# Patient Record
Sex: MALE | Race: WHITE | Employment: UNEMPLOYED | URBAN - METROPOLITAN AREA
[De-identification: names, ages, dates, MRNs, and addresses within clinical notes are randomized per-mention and may not be internally consistent; named-entity substitution may affect disease eponyms.]

---

## 2017-03-23 PROBLEM — K44.9 HIATAL HERNIA: Status: ACTIVE | Noted: 2017-03-23

## 2017-03-23 PROBLEM — Z87.19 H/O GASTRITIS: Status: ACTIVE | Noted: 2017-03-23

## 2018-05-03 ENCOUNTER — OFFICE VISIT (OUTPATIENT)
Dept: CARDIOLOGY CLINIC | Age: 78
End: 2018-05-03
Payer: MEDICARE

## 2018-05-03 VITALS
HEART RATE: 63 BPM | SYSTOLIC BLOOD PRESSURE: 100 MMHG | HEIGHT: 68 IN | DIASTOLIC BLOOD PRESSURE: 68 MMHG | WEIGHT: 176 LBS | BODY MASS INDEX: 26.67 KG/M2

## 2018-05-03 DIAGNOSIS — I45.10 RBBB (RIGHT BUNDLE BRANCH BLOCK): Primary | ICD-10-CM

## 2018-05-03 DIAGNOSIS — K44.9 HIATAL HERNIA: ICD-10-CM

## 2018-05-03 DIAGNOSIS — E11.65 TYPE 2 DIABETES MELLITUS WITH HYPERGLYCEMIA, WITH LONG-TERM CURRENT USE OF INSULIN (HCC): ICD-10-CM

## 2018-05-03 DIAGNOSIS — F32.A DEPRESSION, UNSPECIFIED DEPRESSION TYPE: ICD-10-CM

## 2018-05-03 DIAGNOSIS — I49.1 PREMATURE ATRIAL CONTRACTIONS: ICD-10-CM

## 2018-05-03 DIAGNOSIS — Z87.19 H/O GASTRITIS: ICD-10-CM

## 2018-05-03 DIAGNOSIS — E78.2 MIXED HYPERLIPIDEMIA: ICD-10-CM

## 2018-05-03 DIAGNOSIS — Z79.4 TYPE 2 DIABETES MELLITUS WITH HYPERGLYCEMIA, WITH LONG-TERM CURRENT USE OF INSULIN (HCC): ICD-10-CM

## 2018-05-03 DIAGNOSIS — J45.40 MODERATE PERSISTENT ASTHMA WITHOUT COMPLICATION: ICD-10-CM

## 2018-05-03 DIAGNOSIS — H91.93 BILATERAL HEARING LOSS, UNSPECIFIED HEARING LOSS TYPE: ICD-10-CM

## 2018-05-03 DIAGNOSIS — Z88.8 ASPIRIN ALLERGY: ICD-10-CM

## 2018-05-03 DIAGNOSIS — I35.1 NONRHEUMATIC AORTIC VALVE INSUFFICIENCY: ICD-10-CM

## 2018-05-03 PROCEDURE — 1036F TOBACCO NON-USER: CPT | Performed by: INTERNAL MEDICINE

## 2018-05-03 PROCEDURE — 1123F ACP DISCUSS/DSCN MKR DOCD: CPT | Performed by: INTERNAL MEDICINE

## 2018-05-03 PROCEDURE — 3046F HEMOGLOBIN A1C LEVEL >9.0%: CPT | Performed by: INTERNAL MEDICINE

## 2018-05-03 PROCEDURE — 2022F DILAT RTA XM EVC RTNOPTHY: CPT | Performed by: INTERNAL MEDICINE

## 2018-05-03 PROCEDURE — 3017F COLORECTAL CA SCREEN DOC REV: CPT | Performed by: INTERNAL MEDICINE

## 2018-05-03 PROCEDURE — 4040F PNEUMOC VAC/ADMIN/RCVD: CPT | Performed by: INTERNAL MEDICINE

## 2018-05-03 PROCEDURE — 93000 ELECTROCARDIOGRAM COMPLETE: CPT | Performed by: INTERNAL MEDICINE

## 2018-05-03 PROCEDURE — G8419 CALC BMI OUT NRM PARAM NOF/U: HCPCS | Performed by: INTERNAL MEDICINE

## 2018-05-03 PROCEDURE — 99213 OFFICE O/P EST LOW 20 MIN: CPT | Performed by: INTERNAL MEDICINE

## 2018-05-03 PROCEDURE — G8427 DOCREV CUR MEDS BY ELIG CLIN: HCPCS | Performed by: INTERNAL MEDICINE

## 2018-10-02 ENCOUNTER — HOSPITAL ENCOUNTER (OUTPATIENT)
Dept: CARDIOLOGY | Age: 78
Discharge: HOME | End: 2018-10-02
Payer: MEDICARE

## 2018-10-02 VITALS
BODY MASS INDEX: 27.43 KG/M2 | DIASTOLIC BLOOD PRESSURE: 70 MMHG | HEART RATE: 75 BPM | SYSTOLIC BLOOD PRESSURE: 138 MMHG | HEIGHT: 68 IN | WEIGHT: 181 LBS | OXYGEN SATURATION: 97 %

## 2018-10-02 DIAGNOSIS — I35.1 NONRHEUMATIC AORTIC VALVE INSUFFICIENCY: ICD-10-CM

## 2018-10-02 DIAGNOSIS — I45.10 RBBB (RIGHT BUNDLE BRANCH BLOCK): ICD-10-CM

## 2018-10-02 LAB
LV EF: 55 %
LVEF MODALITY: NORMAL

## 2018-10-02 PROCEDURE — 78452 HT MUSCLE IMAGE SPECT MULT: CPT

## 2018-10-02 PROCEDURE — 93306 TTE W/DOPPLER COMPLETE: CPT

## 2018-10-02 PROCEDURE — 2580000003 HC RX 258: Performed by: INTERNAL MEDICINE

## 2018-10-02 PROCEDURE — A9502 TC99M TETROFOSMIN: HCPCS | Performed by: INTERNAL MEDICINE

## 2018-10-02 PROCEDURE — 93017 CV STRESS TEST TRACING ONLY: CPT

## 2018-10-02 PROCEDURE — 3430000000 HC RX DIAGNOSTIC RADIOPHARMACEUTICAL: Performed by: INTERNAL MEDICINE

## 2018-10-02 RX ORDER — SODIUM CHLORIDE 0.9 % (FLUSH) 0.9 %
10 SYRINGE (ML) INJECTION PRN
Status: DISCONTINUED | OUTPATIENT
Start: 2018-10-02 | End: 2018-10-03 | Stop reason: HOSPADM

## 2018-10-02 RX ADMIN — TETROFOSMIN 32.1 MILLICURIE: 0.23 INJECTION, POWDER, LYOPHILIZED, FOR SOLUTION INTRAVENOUS at 12:11

## 2018-10-02 RX ADMIN — TETROFOSMIN 10 MILLICURIE: 0.23 INJECTION, POWDER, LYOPHILIZED, FOR SOLUTION INTRAVENOUS at 09:15

## 2018-10-02 RX ADMIN — Medication 10 ML: at 12:11

## 2018-10-02 RX ADMIN — Medication 10 ML: at 09:15

## 2018-10-18 ENCOUNTER — OFFICE VISIT (OUTPATIENT)
Dept: CARDIOLOGY CLINIC | Age: 78
End: 2018-10-18
Payer: MEDICARE

## 2018-10-18 VITALS
WEIGHT: 176 LBS | DIASTOLIC BLOOD PRESSURE: 78 MMHG | HEIGHT: 68 IN | HEART RATE: 64 BPM | SYSTOLIC BLOOD PRESSURE: 130 MMHG | BODY MASS INDEX: 26.67 KG/M2

## 2018-10-18 DIAGNOSIS — E11.65 TYPE 2 DIABETES MELLITUS WITH HYPERGLYCEMIA, WITH LONG-TERM CURRENT USE OF INSULIN (HCC): ICD-10-CM

## 2018-10-18 DIAGNOSIS — J33.9 NASAL POLYPOSIS: ICD-10-CM

## 2018-10-18 DIAGNOSIS — J45.40 MODERATE PERSISTENT ASTHMA WITHOUT COMPLICATION: ICD-10-CM

## 2018-10-18 DIAGNOSIS — I45.10 RBBB (RIGHT BUNDLE BRANCH BLOCK): Primary | ICD-10-CM

## 2018-10-18 DIAGNOSIS — E78.2 MIXED HYPERLIPIDEMIA: ICD-10-CM

## 2018-10-18 DIAGNOSIS — Z87.19 H/O GASTRITIS: ICD-10-CM

## 2018-10-18 DIAGNOSIS — Z88.8 ASPIRIN ALLERGY: ICD-10-CM

## 2018-10-18 DIAGNOSIS — J32.9 CHRONIC SINUSITIS, UNSPECIFIED LOCATION: ICD-10-CM

## 2018-10-18 DIAGNOSIS — K44.9 HIATAL HERNIA: ICD-10-CM

## 2018-10-18 DIAGNOSIS — F32.A DEPRESSION, UNSPECIFIED DEPRESSION TYPE: ICD-10-CM

## 2018-10-18 DIAGNOSIS — H91.93 BILATERAL HEARING LOSS, UNSPECIFIED HEARING LOSS TYPE: ICD-10-CM

## 2018-10-18 DIAGNOSIS — I35.1 NONRHEUMATIC AORTIC VALVE INSUFFICIENCY: ICD-10-CM

## 2018-10-18 DIAGNOSIS — Z79.4 TYPE 2 DIABETES MELLITUS WITH HYPERGLYCEMIA, WITH LONG-TERM CURRENT USE OF INSULIN (HCC): ICD-10-CM

## 2018-10-18 PROCEDURE — G8484 FLU IMMUNIZE NO ADMIN: HCPCS | Performed by: INTERNAL MEDICINE

## 2018-10-18 PROCEDURE — G8427 DOCREV CUR MEDS BY ELIG CLIN: HCPCS | Performed by: INTERNAL MEDICINE

## 2018-10-18 PROCEDURE — 1123F ACP DISCUSS/DSCN MKR DOCD: CPT | Performed by: INTERNAL MEDICINE

## 2018-10-18 PROCEDURE — 4040F PNEUMOC VAC/ADMIN/RCVD: CPT | Performed by: INTERNAL MEDICINE

## 2018-10-18 PROCEDURE — 1101F PT FALLS ASSESS-DOCD LE1/YR: CPT | Performed by: INTERNAL MEDICINE

## 2018-10-18 PROCEDURE — 2022F DILAT RTA XM EVC RTNOPTHY: CPT | Performed by: INTERNAL MEDICINE

## 2018-10-18 PROCEDURE — 3046F HEMOGLOBIN A1C LEVEL >9.0%: CPT | Performed by: INTERNAL MEDICINE

## 2018-10-18 PROCEDURE — G8419 CALC BMI OUT NRM PARAM NOF/U: HCPCS | Performed by: INTERNAL MEDICINE

## 2018-10-18 PROCEDURE — 1036F TOBACCO NON-USER: CPT | Performed by: INTERNAL MEDICINE

## 2018-10-18 PROCEDURE — 3017F COLORECTAL CA SCREEN DOC REV: CPT | Performed by: INTERNAL MEDICINE

## 2018-10-18 PROCEDURE — 99214 OFFICE O/P EST MOD 30 MIN: CPT | Performed by: INTERNAL MEDICINE

## 2019-04-02 ENCOUNTER — OFFICE VISIT (OUTPATIENT)
Dept: CARDIOLOGY CLINIC | Age: 79
End: 2019-04-02
Payer: MEDICARE

## 2019-04-02 VITALS
BODY MASS INDEX: 26.22 KG/M2 | DIASTOLIC BLOOD PRESSURE: 68 MMHG | WEIGHT: 173 LBS | SYSTOLIC BLOOD PRESSURE: 130 MMHG | HEIGHT: 68 IN | HEART RATE: 54 BPM

## 2019-04-02 DIAGNOSIS — J45.20 MILD INTERMITTENT ASTHMA WITHOUT COMPLICATION: ICD-10-CM

## 2019-04-02 DIAGNOSIS — Z87.09 H/O NASAL POLYP: ICD-10-CM

## 2019-04-02 DIAGNOSIS — Z88.8 ASPIRIN ALLERGY: ICD-10-CM

## 2019-04-02 DIAGNOSIS — Z79.4 INSULIN-REQUIRING OR DEPENDENT TYPE II DIABETES MELLITUS (HCC): ICD-10-CM

## 2019-04-02 DIAGNOSIS — R94.39 ABNORMAL NUCLEAR STRESS TEST: ICD-10-CM

## 2019-04-02 DIAGNOSIS — I35.1 TRACE AORTIC REGURGITATION BY PRIOR ECHOCARDIOGRAM: ICD-10-CM

## 2019-04-02 DIAGNOSIS — E78.2 MIXED HYPERLIPIDEMIA: ICD-10-CM

## 2019-04-02 DIAGNOSIS — R00.1 SINUS BRADYCARDIA: ICD-10-CM

## 2019-04-02 DIAGNOSIS — K44.9 HIATAL HERNIA: ICD-10-CM

## 2019-04-02 DIAGNOSIS — I45.10 RBBB (RIGHT BUNDLE BRANCH BLOCK): Primary | ICD-10-CM

## 2019-04-02 DIAGNOSIS — Z87.19 H/O GASTRITIS: ICD-10-CM

## 2019-04-02 DIAGNOSIS — E11.9 INSULIN-REQUIRING OR DEPENDENT TYPE II DIABETES MELLITUS (HCC): ICD-10-CM

## 2019-04-02 DIAGNOSIS — H91.93 BILATERAL HEARING LOSS, UNSPECIFIED HEARING LOSS TYPE: ICD-10-CM

## 2019-04-02 DIAGNOSIS — Z86.59 H/O: DEPRESSION: ICD-10-CM

## 2019-04-02 PROCEDURE — 1036F TOBACCO NON-USER: CPT | Performed by: INTERNAL MEDICINE

## 2019-04-02 PROCEDURE — G8427 DOCREV CUR MEDS BY ELIG CLIN: HCPCS | Performed by: INTERNAL MEDICINE

## 2019-04-02 PROCEDURE — 93000 ELECTROCARDIOGRAM COMPLETE: CPT | Performed by: INTERNAL MEDICINE

## 2019-04-02 PROCEDURE — 4040F PNEUMOC VAC/ADMIN/RCVD: CPT | Performed by: INTERNAL MEDICINE

## 2019-04-02 PROCEDURE — 99213 OFFICE O/P EST LOW 20 MIN: CPT | Performed by: INTERNAL MEDICINE

## 2019-04-02 PROCEDURE — 1123F ACP DISCUSS/DSCN MKR DOCD: CPT | Performed by: INTERNAL MEDICINE

## 2019-04-02 PROCEDURE — G8419 CALC BMI OUT NRM PARAM NOF/U: HCPCS | Performed by: INTERNAL MEDICINE

## 2019-04-02 NOTE — PROGRESS NOTES
OFFICE VISIT        PRIMARY CARE PHYSICIAN:    Tiffanie Leal MD     ALLERGIES / SENSITIVITIES:    Allergies   Allergen Reactions    Aspirin Shortness Of Breath, Nausea And Vomiting and Palpitations     diaphoresis    Dust Mite Extract Other (See Comments)     Nasal congestion    Nsaids Shortness Of Breath, Nausea And Vomiting and Palpitations     diaphoresis    Tylenol [Acetaminophen] Shortness Of Breath, Nausea And Vomiting and Other (See Comments)     Palpitations, diaphoresis    Pcn [Penicillins] Rash      REVIEWED MEDICATIONS:      Current Outpatient Medications:     saxagliptin (ONGLYZA) 2.5 MG TABS tablet, Take 2.5 mg by mouth daily, Disp: , Rfl:     Multiple Vitamins-Minerals (THERAPEUTIC MULTIVITAMIN-MINERALS) tablet, Take 1 tablet by mouth daily, Disp: , Rfl:     cetirizine (ZYRTEC) 10 MG tablet, Take 10 mg by mouth daily, Disp: , Rfl:     metFORMIN (GLUCOPHAGE) 1000 MG tablet, Take 1,000 mg by mouth 2 times daily (with meals). , Disp: , Rfl:     insulin glargine (LANTUS) 100 UNIT/ML injection vial, Inject into the skin 2 times daily 4units at breakfast and 8 at bedtime, Disp: , Rfl:     montelukast (SINGULAIR) 10 MG tablet, Take 10 mg by mouth nightly., Disp: , Rfl:     simvastatin (ZOCOR) 20 MG tablet, Take 40 mg by mouth nightly., Disp: , Rfl:     citalopram (CELEXA) 10 MG tablet, Take 10 mg by mouth daily. , Disp: , Rfl:     ALBUTEROL IN, Inhale  into the lungs as needed. To bring to hospital, Disp: , Rfl:     budesonide-formoterol (SYMBICORT) 160-4.5 MCG/ACT AERO, Inhale 2 puffs into the lungs daily. Instructed to use am of surgery, Disp: , Rfl:     S: REASON FOR VISIT:    Risk factors for coronary artery disease and aortic valve disease. Betha Ahumada is a pleasant 79-year-old male with cardiovascular history as described below. 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 He denies chest pain or dyspnea with his daily activities.   He denies orthopnea, PNDs, lower extremity swelling, palpitations, dizziness, presyncope or syncope. He had relatively recent blood tests done at the [place]. REVIEW OF SYSTEMS:    CONSTITUTIONAL: Denies fevers, chills or night sweats. HEENT: Denies headaches. He wears eyeglasses and has diminished hearing bilaterally but denies any recent changes in hearing or vision. Denies hoarseness, hemoptysis, hematemesis or epistaxis. ENDOCRINE: Denies heat or cold intolerance. He denies polyphagia, polydipsia or polyuria. MUSCULOSKELETAL: Denies any significant arthralgias or myalgias. SKIN: Denies rashes, ulcers or itching. HEME/LYMPH: Denies lymphadenopathy or easy bruisability. HEART: As above. LUNGS: Denies cough or sputum production. GI: He reported an occasional incidence of food getting stuck in his throat in the past which has resolved.  Denies abdominal pain, nausea, vomiting,diarrhea, constipation, rectal bleeding or tarry stools. : Denies hematuria or dysuria. PSYCHIATRIC: He has history of depression but denies any recent mood changes or problems with anxiety. NEUROLOGIC: Denies memory loss, motor weakness, numbness, tingling or tremors.     CARDIOVASCULAR HISTORY:   1. Right bundle branch block. 2. PVC's. 3. Aspirin allergy. 4. Diabetes mellitus. 5. Hyperlipidemia. 6. Myocardial perfusion imaging study done on 2/18/2015 was reported as a normal myocardial perfusion study with no evidence of stress-induced ischemia or infarct with normal left ventricular function with a calculated ejection fraction of 52%. Of note, the patient exercised on a Lukas protocol, achieving 9.6 METS and 85% of the maximum predicted heart rate. 7. Echocardiogram done on 2/12/2015 was reported as showing normal left ventricular size and systolic function with an ejection fraction of 60-65% with stage 1 left ventricular diastolic dysfunction. Mildly dilated left atrium. Mild aortic regurgitation. 8.  Treadmill nuclear stress test done on 10/2/2018: Lukas protocol.   7 minutes and 25 seconds. 7 METS. 80% of the maximum predicted heart rate. Physiologic BP response. No chest pain. No ischemic EKG changes at the heart rate achieved. Nuclear images showed a large, mild, partially reversible defect involving the inferior wall, more consistent with diaphragmatic attenuation and motion artifact and less likely the result of stress-induced ischemia with the gated views showing no regional wall motion abnormality with a calculated ejection fraction of 62%. 9.  Echocardiogram done on 10/2/2018 showed normal left ventricular size and wall thickness with no gross regional wall motion abnormality with an estimated ejection fraction of 55% with stage 1 left ventricular diastolic dysfunction, normal right ventricular size and function. Mildly dilated left atrium, trace aortic regurgitation.      PAST MEDICAL HISTORY:  1. As under cardiovascular history. 2. Asthma. 3. Diminished hearing. 4. Sinus/nasal polyps. S/P bilateral antrostomy/ethmoidectomy/sphenoidectomy/polypectomy on 3/6/2013.  5. S/P tonsillectomy. 6. S/P bilateral inguinal hernia repair. 7. Depression. 8. EGD 2016 for dysphagia:  Mild gastritis and 2-cm hiatal hernia.     FAMILY HISTORY:  Mother  at age 80 from myocardial infarction. Father  at age 80: He needed feeding tube, which he refused. O:  COMPLETE PHYSICAL EXAM:      /68   Pulse 54   Ht 5' 8\" (1.727 m)   Wt 173 lb (78.5 kg)   BMI 26.30 kg/m²      General: Elderly male in no acute distress. Head & Neck: Atraumatic and normocephalic. No JVD. No carotid bruits. Carotid upstrokes normal bilaterally. No thyromegaly. Sclerae not icteric. No xanthelasmas. Mucus membranes moist.  Chest: Symmetrical and nontender. No deformities. Lungs: Clear to auscultation bilaterally. Heart: Normal S1 and S2. No S3 or S4. Grade 1/6 systolic murmur heard at the right upper sternal border. No diastolic blow heard.   Abdomen: Soft and nontender without organomegaly or masses. No bruits. Normal bowel sounds. Extremities: No edema. Posterior tibialis pulses felt bilaterally. Skin: Normal turgor. No rashes or ulcers noted. Neurologic: Oriented x3. No motor or sensory deficit detected.                  REVIEW OF DIAGNOSTIC TESTS:    -EKG done today showed sinus bradycardia with RBBB.                 ASSESSMENT / DIAGNOSIS:   1. Diabetes mellitus - on insulin and oral hypoglycemic agents. 2. Hyperlipidemia:  On statin therapy. 3. Right bundle branch block. 4. Sinus bradycardia. 5. Trace aortic regurgitation. 6. Asthma. 7. Nasal polyps, S/P excision. 8. Aspirin allergy. 9. Bilateral hearing loss. 10. Depression. 11. Hiatal hernia with history of gastritis. 12. Equivocal stress test showing a mild, large, partially reversible defect involving the inferior wall, likely the result of diaphragmatic attenuation and motion artifact and less likely the result of stress-induced ischemia. TREATMENT PLAN:  1. Reassure. 2.  Patient advised to remain active. 3.  Follow up with cardiology in one year or on prn basis. Mary Alice Grissom.  [facility] [phone]

## 2020-11-02 PROBLEM — J45.40 MODERATE PERSISTENT ASTHMA: Status: ACTIVE | Noted: 2020-11-02

## 2020-11-02 RX ORDER — ACETAMINOPHEN 325 MG/1
650 TABLET ORAL ONCE
Status: CANCELLED | OUTPATIENT
Start: 2020-11-02

## 2020-11-02 RX ORDER — DIPHENHYDRAMINE HYDROCHLORIDE 50 MG/ML
50 INJECTION INTRAMUSCULAR; INTRAVENOUS ONCE
Status: CANCELLED | OUTPATIENT
Start: 2020-11-02

## 2020-11-02 RX ORDER — EPINEPHRINE 1 MG/ML
0.3 INJECTION, SOLUTION, CONCENTRATE INTRAVENOUS ONCE
Status: CANCELLED | OUTPATIENT
Start: 2020-11-02

## 2020-12-22 RX ORDER — EPINEPHRINE 1 MG/ML
0.3 INJECTION, SOLUTION, CONCENTRATE INTRAVENOUS PRN
Status: CANCELLED | OUTPATIENT
Start: 2020-12-22

## 2020-12-22 RX ORDER — DIPHENHYDRAMINE HYDROCHLORIDE 50 MG/ML
50 INJECTION INTRAMUSCULAR; INTRAVENOUS ONCE
Status: CANCELLED | OUTPATIENT
Start: 2020-12-22 | End: 2020-12-22

## 2021-01-29 ENCOUNTER — IMMUNIZATION (OUTPATIENT)
Dept: PRIMARY CARE CLINIC | Age: 81
End: 2021-01-29
Payer: MEDICARE

## 2021-01-29 PROCEDURE — 0001A COVID-19, PFIZER VACCINE 30MCG/0.3ML DOSE: CPT | Performed by: NURSE PRACTITIONER

## 2021-01-29 PROCEDURE — 91300 COVID-19, PFIZER VACCINE 30MCG/0.3ML DOSE: CPT | Performed by: NURSE PRACTITIONER

## 2021-02-19 ENCOUNTER — IMMUNIZATION (OUTPATIENT)
Dept: PRIMARY CARE CLINIC | Age: 81
End: 2021-02-19
Payer: MEDICARE

## 2021-02-19 PROCEDURE — 0002A COVID-19, PFIZER VACCINE 30MCG/0.3ML DOSE: CPT | Performed by: PHYSICIAN ASSISTANT

## 2021-02-19 PROCEDURE — 91300 COVID-19, PFIZER VACCINE 30MCG/0.3ML DOSE: CPT | Performed by: PHYSICIAN ASSISTANT

## 2024-03-22 ENCOUNTER — HOSPITAL ENCOUNTER (EMERGENCY)
Age: 84
Discharge: HOME | End: 2024-03-22
Payer: MEDICARE

## 2024-03-22 VITALS
DIASTOLIC BLOOD PRESSURE: 77 MMHG | OXYGEN SATURATION: 92 % | SYSTOLIC BLOOD PRESSURE: 131 MMHG | TEMPERATURE: 98.2 F | RESPIRATION RATE: 18 BRPM | HEART RATE: 72 BPM

## 2024-03-22 DIAGNOSIS — J45.909 UNCOMPLICATED ASTHMA, UNSPECIFIED ASTHMA SEVERITY, UNSPECIFIED WHETHER PERSISTENT: Primary | ICD-10-CM

## 2024-03-22 DIAGNOSIS — J20.9 ACUTE BRONCHITIS, UNSPECIFIED ORGANISM: ICD-10-CM

## 2024-03-22 LAB
INFLUENZA A BY PCR: NOT DETECTED
INFLUENZA B BY PCR: NOT DETECTED
SARS-COV-2 RDRP RESP QL NAA+PROBE: NOT DETECTED
SPECIMEN DESCRIPTION: NORMAL

## 2024-03-22 PROCEDURE — 87502 INFLUENZA DNA AMP PROBE: CPT

## 2024-03-22 PROCEDURE — 87635 SARS-COV-2 COVID-19 AMP PRB: CPT

## 2024-03-22 PROCEDURE — 99211 OFF/OP EST MAY X REQ PHY/QHP: CPT

## 2024-03-22 RX ORDER — DOXYCYCLINE HYCLATE 100 MG
100 TABLET ORAL 2 TIMES DAILY
Qty: 14 TABLET | Refills: 0 | Status: SHIPPED | OUTPATIENT
Start: 2024-03-22 | End: 2024-03-29

## 2024-03-22 RX ORDER — PREDNISONE 10 MG/1
TABLET ORAL
Qty: 12 TABLET | Refills: 0 | Status: SHIPPED | OUTPATIENT
Start: 2024-03-22

## 2024-03-22 NOTE — ED PROVIDER NOTES
[facility] URGENT CARE  EMERGENCY DEPARTMENT ENCOUNTER        NAME: Dustin Meek  :  1940  MRN:  [id number]  Date of evaluation: 3/22/2024  Provider: Jaden Jaime PA-C  PCP: David Mcbride MD  Note Started : 12:53 PM EDT 3/22/24    Chief Complaint: Shortness of Breath (Asthma  cough  congestion)      This is a 83-year-old male that presents to urgent care with family.  For the past 3 to 4 days he has been having some increased asthma symptoms has been wheezing along with some cough and congestion in his chest.  He did state a little bit of shortness of breath.  He denies any lightheadedness or dizziness.  No other chest pain.  No abdominal pain nausea vomiting diarrhea or urinary symptoms.  He has been using breathing treatments.  On first contact patient he appears to be in no acute distress.        Review of Systems  Pertinent positives and negatives are stated within HPI, all other systems reviewed and are negative.     Allergies: Aspirin, Dust mite extract, Nsaids, Tylenol [acetaminophen], and Pcn [penicillins]     --------------------------------------------- PAST HISTORY ---------------------------------------------  Past Medical History:  has a past medical history of Asthma, Depression, Diabetes mellitus (HCC), [place] (hard of hearing), Hyperlipidemia, and Sinus polyp.    Past Surgical History:  has a past surgical history that includes hernia repair; Tonsillectomy; and other surgical history (3/6/2013).    Social History:  reports that he quit smoking about 54 years ago. He has never used smokeless tobacco. He reports that he does not drink alcohol and does not use drugs.    Family History: family history is not on file.     The patient’s home medications have been reviewed.    The nursing notes within the ED encounter have been reviewed.     ------------------------------------------------SCREENINGS----------------------------------------------

## 2024-05-13 ENCOUNTER — APPOINTMENT (OUTPATIENT)
Dept: GENERAL RADIOLOGY | Age: 84
End: 2024-05-13
Payer: MEDICARE

## 2024-05-13 ENCOUNTER — APPOINTMENT (OUTPATIENT)
Dept: CT IMAGING | Age: 84
End: 2024-05-13
Payer: MEDICARE

## 2024-05-13 ENCOUNTER — HOSPITAL ENCOUNTER (EMERGENCY)
Age: 84
Discharge: HOME | End: 2024-05-13
Attending: EMERGENCY MEDICINE
Payer: MEDICARE

## 2024-05-13 VITALS
OXYGEN SATURATION: 100 % | DIASTOLIC BLOOD PRESSURE: 88 MMHG | BODY MASS INDEX: 25.85 KG/M2 | RESPIRATION RATE: 16 BRPM | TEMPERATURE: 98.2 F | WEIGHT: 170 LBS | SYSTOLIC BLOOD PRESSURE: 166 MMHG | HEART RATE: 78 BPM

## 2024-05-13 DIAGNOSIS — I45.2 BIFASCICULAR BLOCK: ICD-10-CM

## 2024-05-13 DIAGNOSIS — R40.4 TRANSIENT ALTERATION OF AWARENESS: Primary | ICD-10-CM

## 2024-05-13 DIAGNOSIS — R03.0 ELEVATED BLOOD PRESSURE READING: ICD-10-CM

## 2024-05-13 LAB
ALBUMIN SERPL-MCNC: 3.7 G/DL (ref 3.5–5.2)
ALP SERPL-CCNC: 93 U/L (ref 40–129)
ALT SERPL-CCNC: 13 U/L (ref 0–40)
ANION GAP SERPL CALCULATED.3IONS-SCNC: 10 MMOL/L (ref 7–16)
AST SERPL-CCNC: 16 U/L (ref 0–39)
BASOPHILS # BLD: 0.04 K/UL (ref 0–0.2)
BASOPHILS NFR BLD: 0 % (ref 0–2)
BILIRUB SERPL-MCNC: 0.5 MG/DL (ref 0–1.2)
BNP SERPL-MCNC: 187 PG/ML (ref 0–450)
BUN SERPL-MCNC: 20 MG/DL (ref 6–23)
CALCIUM SERPL-MCNC: 9.5 MG/DL (ref 8.6–10.2)
CHLORIDE SERPL-SCNC: 99 MMOL/L (ref 98–107)
CO2 SERPL-SCNC: 28 MMOL/L (ref 22–29)
CREAT SERPL-MCNC: 1 MG/DL (ref 0.7–1.2)
EOSINOPHIL # BLD: 0.11 K/UL (ref 0.05–0.5)
EOSINOPHILS RELATIVE PERCENT: 1 % (ref 0–6)
ERYTHROCYTE [DISTWIDTH] IN BLOOD BY AUTOMATED COUNT: 13.1 % (ref 11.5–15)
GFR, ESTIMATED: 79 ML/MIN/1.73M2
GLUCOSE BLD-MCNC: 240 MG/DL (ref 74–99)
GLUCOSE SERPL-MCNC: 328 MG/DL (ref 74–99)
HCT VFR BLD AUTO: 47.7 % (ref 37–54)
HGB BLD-MCNC: 15.6 G/DL (ref 12.5–16.5)
IMM GRANULOCYTES # BLD AUTO: 0.03 K/UL (ref 0–0.58)
IMM GRANULOCYTES NFR BLD: 0 % (ref 0–5)
LYMPHOCYTES NFR BLD: 0.95 K/UL (ref 1.5–4)
LYMPHOCYTES RELATIVE PERCENT: 10 % (ref 20–42)
MCH RBC QN AUTO: 30.5 PG (ref 26–35)
MCHC RBC AUTO-ENTMCNC: 32.7 G/DL (ref 32–34.5)
MCV RBC AUTO: 93.3 FL (ref 80–99.9)
MONOCYTES NFR BLD: 1.27 K/UL (ref 0.1–0.95)
MONOCYTES NFR BLD: 14 % (ref 2–12)
NEUTROPHILS NFR BLD: 74 % (ref 43–80)
NEUTS SEG NFR BLD: 6.8 K/UL (ref 1.8–7.3)
PLATELET # BLD AUTO: 206 K/UL (ref 130–450)
PMV BLD AUTO: 11.6 FL (ref 7–12)
POTASSIUM SERPL-SCNC: 4.6 MMOL/L (ref 3.5–5)
PROT SERPL-MCNC: 7.2 G/DL (ref 6.4–8.3)
RBC # BLD AUTO: 5.11 M/UL (ref 3.8–5.8)
SODIUM SERPL-SCNC: 137 MMOL/L (ref 132–146)
TROPONIN I SERPL HS-MCNC: 21 NG/L (ref 0–11)
TROPONIN I SERPL HS-MCNC: 22 NG/L (ref 0–11)
WBC OTHER # BLD: 9.2 K/UL (ref 4.5–11.5)

## 2024-05-13 PROCEDURE — 84484 ASSAY OF TROPONIN QUANT: CPT

## 2024-05-13 PROCEDURE — 82962 GLUCOSE BLOOD TEST: CPT

## 2024-05-13 PROCEDURE — 85025 COMPLETE CBC W/AUTO DIFF WBC: CPT

## 2024-05-13 PROCEDURE — 80053 COMPREHEN METABOLIC PANEL: CPT

## 2024-05-13 PROCEDURE — 70450 CT HEAD/BRAIN W/O DYE: CPT

## 2024-05-13 PROCEDURE — 99285 EMERGENCY DEPT VISIT HI MDM: CPT

## 2024-05-13 PROCEDURE — 83880 ASSAY OF NATRIURETIC PEPTIDE: CPT

## 2024-05-13 PROCEDURE — 93005 ELECTROCARDIOGRAM TRACING: CPT | Performed by: EMERGENCY MEDICINE

## 2024-05-13 PROCEDURE — 71046 X-RAY EXAM CHEST 2 VIEWS: CPT

## 2024-05-13 ASSESSMENT — PAIN - FUNCTIONAL ASSESSMENT: PAIN_FUNCTIONAL_ASSESSMENT: NONE - DENIES PAIN

## 2024-05-13 ASSESSMENT — LIFESTYLE VARIABLES
HOW MANY STANDARD DRINKS CONTAINING ALCOHOL DO YOU HAVE ON A TYPICAL DAY: PATIENT DOES NOT DRINK
HOW OFTEN DO YOU HAVE A DRINK CONTAINING ALCOHOL: NEVER

## 2024-05-14 LAB
EKG ATRIAL RATE: 77 BPM
EKG P AXIS: 25 DEGREES
EKG P-R INTERVAL: 164 MS
EKG Q-T INTERVAL: 398 MS
EKG QRS DURATION: 148 MS
EKG QTC CALCULATION (BAZETT): 450 MS
EKG R AXIS: -63 DEGREES
EKG T AXIS: -19 DEGREES
EKG VENTRICULAR RATE: 77 BPM

## 2024-05-14 PROCEDURE — 93010 ELECTROCARDIOGRAM REPORT: CPT | Performed by: INTERNAL MEDICINE

## 2024-05-14 NOTE — ED PROVIDER NOTES
[facility] EMERGENCY DEPARTMENT  EMERGENCY DEPARTMENT ENCOUNTER      Pt Name: Dustin Meek  MRN: [id number]  Birthdate 1940  Date of evaluation: 5/13/2024  Provider: Aldo Ma DO  PCP: David Mcbride MD  Note Started: 11:18 PM EDT 5/13/24    CHIEF COMPLAINT       Chief Complaint   Patient presents with    Altered Mental Status     States was driving to [place] this morning and there was a moment he was unsure of where he was at. Patient A&O x4 during triage. States has had abnormal HR and elevated BP       HISTORY OF PRESENT ILLNESS: 1 or more Elements   History From: Patient  Limitations to history : None    Dustin Meek is a 83 y.o. male who presents to the ED for evaluation of near syncope. Patient states that today he was driving to [place] when he felt as though he was nearly going to pass out. He states that he was awake but felt a near out of body experience. He was able to pull over and this then resolved on its own. Patient has no chest pain or shortness of breath. He states that his wife took his blood pressure and notes that it was elevated which prompted his wife to advised him to come to the ED. Patient has no other reported mitigating or worsening factors.    Nursing Notes were all reviewed and agreed with or any disagreements were addressed in the HPI.    REVIEW OF SYSTEMS :    Positives and Pertinent negatives as per HPI.     SURGICAL HISTORY     Past Surgical History:   Procedure Laterality Date    HERNIA REPAIR      inguinal bilateral    OTHER SURGICAL HISTORY  3/6/2013    bilateral arrtrostomy/ethmoidectomy/sphenoidectomy/plopectomy    TONSILLECTOMY         CURRENTMEDICATIONS       Discharge Medication List as of 5/13/2024  8:50 PM        CONTINUE these medications which have NOT CHANGED    Details   predniSONE (DELTASONE) 10 MG tablet Take 30mg by mouth daily x 2 days, then 20mg by mouth daily x 2 days then 10mg by mouth daily x 2 days.

## 2024-06-11 ENCOUNTER — OFFICE VISIT (OUTPATIENT)
Dept: CARDIOLOGY CLINIC | Age: 84
End: 2024-06-11
Payer: MEDICARE

## 2024-06-11 VITALS
SYSTOLIC BLOOD PRESSURE: 122 MMHG | DIASTOLIC BLOOD PRESSURE: 52 MMHG | WEIGHT: 166 LBS | RESPIRATION RATE: 18 BRPM | HEIGHT: 68 IN | BODY MASS INDEX: 25.16 KG/M2 | HEART RATE: 61 BPM

## 2024-06-11 DIAGNOSIS — E11.9 INSULIN-REQUIRING OR DEPENDENT TYPE II DIABETES MELLITUS (HCC): ICD-10-CM

## 2024-06-11 DIAGNOSIS — Z87.19 H/O GASTRITIS: ICD-10-CM

## 2024-06-11 DIAGNOSIS — F32.89 OTHER DEPRESSION: ICD-10-CM

## 2024-06-11 DIAGNOSIS — J45.40 MODERATE PERSISTENT ASTHMA WITHOUT COMPLICATION: ICD-10-CM

## 2024-06-11 DIAGNOSIS — E78.49 OTHER HYPERLIPIDEMIA: ICD-10-CM

## 2024-06-11 DIAGNOSIS — Z79.4 INSULIN-REQUIRING OR DEPENDENT TYPE II DIABETES MELLITUS (HCC): ICD-10-CM

## 2024-06-11 DIAGNOSIS — I10 PRIMARY HYPERTENSION: ICD-10-CM

## 2024-06-11 DIAGNOSIS — I45.10 RBBB (RIGHT BUNDLE BRANCH BLOCK): Primary | ICD-10-CM

## 2024-06-11 DIAGNOSIS — K44.9 HIATAL HERNIA: ICD-10-CM

## 2024-06-11 DIAGNOSIS — J33.9 NASAL POLYPOSIS: ICD-10-CM

## 2024-06-11 DIAGNOSIS — H91.93 BILATERAL HEARING LOSS, UNSPECIFIED HEARING LOSS TYPE: ICD-10-CM

## 2024-06-11 DIAGNOSIS — Z88.8 ASPIRIN ALLERGY: ICD-10-CM

## 2024-06-11 DIAGNOSIS — I49.3 PVC'S (PREMATURE VENTRICULAR CONTRACTIONS): ICD-10-CM

## 2024-06-11 DIAGNOSIS — I44.0 FIRST DEGREE ATRIOVENTRICULAR BLOCK: ICD-10-CM

## 2024-06-11 PROCEDURE — 3074F SYST BP LT 130 MM HG: CPT | Performed by: INTERNAL MEDICINE

## 2024-06-11 PROCEDURE — G8427 DOCREV CUR MEDS BY ELIG CLIN: HCPCS | Performed by: INTERNAL MEDICINE

## 2024-06-11 PROCEDURE — 3078F DIAST BP <80 MM HG: CPT | Performed by: INTERNAL MEDICINE

## 2024-06-11 PROCEDURE — 1036F TOBACCO NON-USER: CPT | Performed by: INTERNAL MEDICINE

## 2024-06-11 PROCEDURE — 1123F ACP DISCUSS/DSCN MKR DOCD: CPT | Performed by: INTERNAL MEDICINE

## 2024-06-11 PROCEDURE — G8419 CALC BMI OUT NRM PARAM NOF/U: HCPCS | Performed by: INTERNAL MEDICINE

## 2024-06-11 PROCEDURE — 93000 ELECTROCARDIOGRAM COMPLETE: CPT | Performed by: INTERNAL MEDICINE

## 2024-06-11 PROCEDURE — 99204 OFFICE O/P NEW MOD 45 MIN: CPT | Performed by: INTERNAL MEDICINE

## 2024-06-11 RX ORDER — OMEGA-3S/DHA/EPA/FISH OIL/D3 300MG-1000
400 CAPSULE ORAL DAILY
COMMUNITY

## 2024-06-11 RX ORDER — BUPROPION HYDROCHLORIDE 100 MG/1
150 TABLET ORAL DAILY
COMMUNITY

## 2024-06-11 RX ORDER — GLIPIZIDE 10 MG/1
10 TABLET ORAL
COMMUNITY

## 2024-06-11 RX ORDER — LISINOPRIL 5 MG/1
5 TABLET ORAL EVERY MORNING
COMMUNITY
Start: 2024-05-16

## 2024-06-11 RX ORDER — FLUTICASONE PROPIONATE 50 MCG
1 SPRAY, SUSPENSION (ML) NASAL DAILY
COMMUNITY

## 2024-06-11 RX ORDER — INSULIN ASPART 100 [IU]/ML
100 INJECTION, SOLUTION INTRAVENOUS; SUBCUTANEOUS
COMMUNITY
Start: 2024-04-16

## 2024-06-11 NOTE — PROGRESS NOTES
OFFICE VISIT        PRIMARY CARE PHYSICIAN:    David Mcbride MD         ALLERGIES / SENSITIVITIES:    Allergies   Allergen Reactions    Aspirin Shortness Of Breath, Nausea And Vomiting and Palpitations     diaphoresis    Dust Mite Extract Other (See Comments)     Nasal congestion    Nsaids Shortness Of Breath, Nausea And Vomiting and Palpitations     diaphoresis    Tylenol [Acetaminophen] Shortness Of Breath, Nausea And Vomiting and Other (See Comments)     Palpitations, diaphoresis    Pcn [Penicillins] Rash          REVIEWED MEDICATIONS:      Current Outpatient Medications:     insulin aspart (NOVOLOG FLEXPEN) 100 UNIT/ML injection pen, Inject 100 Units into the skin 3 times daily (before meals) 2 units every morning,1 unit at lunch, 5 units at dinner, Disp: , Rfl:     lisinopril (PRINIVIL;ZESTRIL) 5 MG tablet, Take 1 tablet by mouth every morning, Disp: , Rfl:     Semaglutide,0.25 or 0.5MG/DOS, 2 MG/3ML SOPN, Inject into the skin once a week, Disp: , Rfl:     glipiZIDE (GLUCOTROL) 10 MG tablet, Take 1 tablet by mouth 2 times daily (before meals) 1/2 tablet every morning, 1 tablet at dinner, Disp: , Rfl:     vitamin D3 (CHOLECALCIFEROL) 10 MCG (400 UNIT) TABS tablet, Take 1 tablet by mouth daily Taking 1 tablet everyday but Saturday and Sunday, Disp: , Rfl:     buPROPion (WELLBUTRIN) 100 MG tablet, Take 1.5 tablets by mouth daily, Disp: , Rfl:     olodaterol (STRIVERDI RESPIMAT) 2.5 MCG/ACT inhaler, Inhale 2 puffs into the lungs daily, Disp: , Rfl:     fluticasone (FLONASE) 50 MCG/ACT nasal spray, 1 spray by Each Nostril route daily, Disp: , Rfl:     mometasone (ASMANEX) 200 MCG/ACT AERO inhaler, Inhale 1 puff into the lungs 2 times daily, Disp: , Rfl:     Multiple Vitamins-Minerals (THERAPEUTIC MULTIVITAMIN-MINERALS) tablet, Take 1 tablet by mouth daily, Disp: , Rfl:     cetirizine (ZYRTEC) 10 MG tablet, Take 1 tablet by mouth daily, Disp: , Rfl:     metFORMIN (GLUCOPHAGE) 1000 MG tablet, Take 1 tablet

## 2024-12-12 ENCOUNTER — OFFICE VISIT (OUTPATIENT)
Dept: CARDIOLOGY CLINIC | Age: 84
End: 2024-12-12

## 2024-12-12 VITALS
HEART RATE: 64 BPM | WEIGHT: 163 LBS | RESPIRATION RATE: 18 BRPM | SYSTOLIC BLOOD PRESSURE: 132 MMHG | BODY MASS INDEX: 24.71 KG/M2 | DIASTOLIC BLOOD PRESSURE: 68 MMHG | HEIGHT: 68 IN

## 2024-12-12 DIAGNOSIS — I44.0 FIRST DEGREE ATRIOVENTRICULAR BLOCK: ICD-10-CM

## 2024-12-12 DIAGNOSIS — J45.40 MODERATE PERSISTENT ASTHMA WITHOUT COMPLICATION: ICD-10-CM

## 2024-12-12 DIAGNOSIS — E78.49 OTHER HYPERLIPIDEMIA: ICD-10-CM

## 2024-12-12 DIAGNOSIS — E11.9 INSULIN-REQUIRING OR DEPENDENT TYPE II DIABETES MELLITUS (HCC): ICD-10-CM

## 2024-12-12 DIAGNOSIS — I35.1 NONRHEUMATIC AORTIC VALVE INSUFFICIENCY: ICD-10-CM

## 2024-12-12 DIAGNOSIS — Z79.4 INSULIN-REQUIRING OR DEPENDENT TYPE II DIABETES MELLITUS (HCC): ICD-10-CM

## 2024-12-12 DIAGNOSIS — H91.93 BILATERAL HEARING LOSS, UNSPECIFIED HEARING LOSS TYPE: ICD-10-CM

## 2024-12-12 DIAGNOSIS — R01.1 HEART MURMUR: ICD-10-CM

## 2024-12-12 DIAGNOSIS — Z88.8 ASPIRIN ALLERGY: ICD-10-CM

## 2024-12-12 DIAGNOSIS — I45.10 RBBB (RIGHT BUNDLE BRANCH BLOCK): ICD-10-CM

## 2024-12-12 DIAGNOSIS — J33.9 NASAL POLYPOSIS: ICD-10-CM

## 2024-12-12 DIAGNOSIS — I10 PRIMARY HYPERTENSION: Primary | ICD-10-CM

## 2024-12-12 DIAGNOSIS — K44.9 HIATAL HERNIA: ICD-10-CM

## 2024-12-12 DIAGNOSIS — Z87.19 H/O GASTRITIS: ICD-10-CM

## 2024-12-12 DIAGNOSIS — F32.89 OTHER DEPRESSION: ICD-10-CM

## 2024-12-12 RX ORDER — FLUTICASONE FUROATE, UMECLIDINIUM BROMIDE AND VILANTEROL TRIFENATATE 100; 62.5; 25 UG/1; UG/1; UG/1
1 POWDER RESPIRATORY (INHALATION) DAILY
COMMUNITY

## 2024-12-12 NOTE — PROGRESS NOTES
OFFICE VISIT        PRIMARY CARE PHYSICIAN:    David Mcbride MD         ALLERGIES / SENSITIVITIES:    Allergies   Allergen Reactions    Aspirin Shortness Of Breath, Nausea And Vomiting and Palpitations     diaphoresis    Dust Mite Extract Other (See Comments)     Nasal congestion    Nsaids Shortness Of Breath, Nausea And Vomiting and Palpitations     diaphoresis    Tylenol [Acetaminophen] Shortness Of Breath, Nausea And Vomiting and Other (See Comments)     Palpitations, diaphoresis    Pcn [Penicillins] Rash          REVIEWED MEDICATIONS:      Current Outpatient Medications:     fluticasone-umeclidin-vilant (TRELEGY ELLIPTA) 100-62.5-25 MCG/ACT AEPB inhaler, Inhale 1 puff into the lungs daily, Disp: , Rfl:     insulin aspart (NOVOLOG FLEXPEN) 100 UNIT/ML injection pen, Inject 100 Units into the skin 3 times daily (before meals) 2 units every morning,1 unit at lunch, 5 units at dinner, Disp: , Rfl:     lisinopril (PRINIVIL;ZESTRIL) 5 MG tablet, Take 1 tablet by mouth every morning, Disp: , Rfl:     Semaglutide,0.25 or 0.5MG/DOS, 2 MG/3ML SOPN, Inject into the skin once a week, Disp: , Rfl:     vitamin D3 (CHOLECALCIFEROL) 10 MCG (400 UNIT) TABS tablet, Take 1 tablet by mouth daily Taking 1 tablet everyday but Saturday and Sunday, Disp: , Rfl:     buPROPion (WELLBUTRIN) 100 MG tablet, Take 1.5 tablets by mouth daily, Disp: , Rfl:     fluticasone (FLONASE) 50 MCG/ACT nasal spray, 1 spray by Each Nostril route daily, Disp: , Rfl:     Multiple Vitamins-Minerals (THERAPEUTIC MULTIVITAMIN-MINERALS) tablet, Take 1 tablet by mouth daily, Disp: , Rfl:     cetirizine (ZYRTEC) 10 MG tablet, Take 1 tablet by mouth daily, Disp: , Rfl:     metFORMIN (GLUCOPHAGE) 1000 MG tablet, Take 1 tablet by mouth 2 times daily (with meals), Disp: , Rfl:     insulin glargine (LANTUS) 100 UNIT/ML injection vial, Inject 22 Units into the skin nightly 26 units daily, Disp: , Rfl:     montelukast (SINGULAIR) 10 MG tablet, Take 1 tablet by

## 2025-01-03 ENCOUNTER — HOSPITAL ENCOUNTER (OUTPATIENT)
Dept: CARDIOLOGY | Age: 85
Discharge: HOME | End: 2025-01-03
Attending: INTERNAL MEDICINE
Payer: MEDICARE

## 2025-01-03 VITALS — HEIGHT: 68 IN | BODY MASS INDEX: 24.71 KG/M2 | WEIGHT: 163 LBS

## 2025-01-03 DIAGNOSIS — R01.1 HEART MURMUR: ICD-10-CM

## 2025-01-03 DIAGNOSIS — I35.1 NONRHEUMATIC AORTIC VALVE INSUFFICIENCY: ICD-10-CM

## 2025-01-03 LAB
ECHO AO ASC DIAM: 3.4 CM
ECHO AO ASCENDING AORTA INDEX: 1.82 CM/M2
ECHO AR MAX VEL PISA: 3.5 M/S
ECHO AV AREA PEAK VELOCITY: 2.1 CM2
ECHO AV AREA VTI: 2.3 CM2
ECHO AV AREA/BSA PEAK VELOCITY: 1.1 CM2/M2
ECHO AV AREA/BSA VTI: 1.2 CM2/M2
ECHO AV CUSP MM: 2 CM
ECHO AV MEAN GRADIENT: 5 MMHG
ECHO AV MEAN VELOCITY: 1 M/S
ECHO AV PEAK GRADIENT: 8 MMHG
ECHO AV PEAK VELOCITY: 1.4 M/S
ECHO AV REGURGITANT PHT: 530.4 MILLISECOND
ECHO AV VELOCITY RATIO: 0.64
ECHO AV VTI: 30.8 CM
ECHO BSA: 1.88 M2
ECHO LA DIAMETER INDEX: 2.14 CM/M2
ECHO LA DIAMETER: 4 CM
ECHO LA VOL A-L A2C: 77 ML (ref 18–58)
ECHO LA VOL A-L A4C: 46 ML (ref 18–58)
ECHO LA VOL MOD A2C: 72 ML (ref 18–58)
ECHO LA VOL MOD A4C: 45 ML (ref 18–58)
ECHO LA VOLUME AREA LENGTH: 64 ML
ECHO LA VOLUME INDEX A-L A2C: 41 ML/M2 (ref 16–34)
ECHO LA VOLUME INDEX A-L A4C: 25 ML/M2 (ref 16–34)
ECHO LA VOLUME INDEX AREA LENGTH: 34 ML/M2 (ref 16–34)
ECHO LA VOLUME INDEX MOD A2C: 39 ML/M2 (ref 16–34)
ECHO LA VOLUME INDEX MOD A4C: 24 ML/M2 (ref 16–34)
ECHO LV EF PHYSICIAN: 70 %
ECHO LV EJECTION FRACTION 3D: 70 %
ECHO LV FRACTIONAL SHORTENING: 46 % (ref 28–44)
ECHO LV INTERNAL DIMENSION DIASTOLE INDEX: 2.57 CM/M2
ECHO LV INTERNAL DIMENSION DIASTOLIC: 4.8 CM (ref 4.2–5.9)
ECHO LV INTERNAL DIMENSION SYSTOLIC INDEX: 1.39 CM/M2
ECHO LV INTERNAL DIMENSION SYSTOLIC: 2.6 CM
ECHO LV ISOVOLUMETRIC RELAXATION TIME (IVRT): 121.1 MS
ECHO LV IVSD: 1.4 CM (ref 0.6–1)
ECHO LV IVSS: 1.8 CM
ECHO LV MASS 2D: 273.8 G (ref 88–224)
ECHO LV MASS INDEX 2D: 146.4 G/M2 (ref 49–115)
ECHO LV POSTERIOR WALL DIASTOLIC: 1.4 CM (ref 0.6–1)
ECHO LV POSTERIOR WALL SYSTOLIC: 1.5 CM
ECHO LV RELATIVE WALL THICKNESS RATIO: 0.58
ECHO LVOT AREA: 3.5 CM2
ECHO LVOT AV VTI INDEX: 0.69
ECHO LVOT DIAM: 2.1 CM
ECHO LVOT MEAN GRADIENT: 2 MMHG
ECHO LVOT PEAK GRADIENT: 3 MMHG
ECHO LVOT PEAK VELOCITY: 0.9 M/S
ECHO LVOT STROKE VOLUME INDEX: 39.1 ML/M2
ECHO LVOT SV: 73 ML
ECHO LVOT VTI: 21.1 CM
ECHO MV "A" WAVE DURATION: 145.3 MSEC
ECHO MV A VELOCITY: 0.84 M/S
ECHO MV AREA PHT: 2.5 CM2
ECHO MV AREA VTI: 3 CM2
ECHO MV E DECELERATION TIME (DT): 354.3 MS
ECHO MV E VELOCITY: 0.63 M/S
ECHO MV E/A RATIO: 0.75
ECHO MV LVOT VTI INDEX: 1.16
ECHO MV MAX VELOCITY: 1 M/S
ECHO MV MEAN GRADIENT: 1 MMHG
ECHO MV MEAN VELOCITY: 0.5 M/S
ECHO MV PEAK GRADIENT: 4 MMHG
ECHO MV PRESSURE HALF TIME (PHT): 86.4 MS
ECHO MV VTI: 24.4 CM
ECHO PV MAX VELOCITY: 1.1 M/S
ECHO PV MEAN GRADIENT: 3 MMHG
ECHO PV MEAN VELOCITY: 0.8 M/S
ECHO PV PEAK GRADIENT: 5 MMHG
ECHO PV VTI: 20.4 CM
ECHO PVEIN A DURATION: 128 MS
ECHO PVEIN A VELOCITY: 0.3 M/S
ECHO PVEIN PEAK D VELOCITY: 0.4 M/S
ECHO PVEIN PEAK S VELOCITY: 0.5 M/S
ECHO PVEIN S/D RATIO: 1.3
ECHO RV INTERNAL DIMENSION: 3.6 CM
ECHO TV REGURGITANT MAX VELOCITY: 2.56 M/S
ECHO TV REGURGITANT PEAK GRADIENT: 26 MMHG

## 2025-01-03 PROCEDURE — 2500000003 HC RX 250 WO HCPCS: Performed by: INTERNAL MEDICINE

## 2025-01-03 PROCEDURE — 93306 TTE W/DOPPLER COMPLETE: CPT

## 2025-01-03 PROCEDURE — 93306 TTE W/DOPPLER COMPLETE: CPT | Performed by: INTERNAL MEDICINE

## 2025-01-03 RX ORDER — SODIUM CHLORIDE 0.9 % (FLUSH) 0.9 %
10 SYRINGE (ML) INJECTION PRN
Status: ACTIVE | OUTPATIENT
Start: 2025-01-03

## 2025-01-03 RX ADMIN — SODIUM CHLORIDE, PRESERVATIVE FREE 10 ML: 5 INJECTION INTRAVENOUS at 09:40

## 2025-01-03 RX ADMIN — SODIUM CHLORIDE, PRESERVATIVE FREE 10 ML: 5 INJECTION INTRAVENOUS at 09:38
